# Patient Record
Sex: MALE | Race: WHITE | URBAN - METROPOLITAN AREA
[De-identification: names, ages, dates, MRNs, and addresses within clinical notes are randomized per-mention and may not be internally consistent; named-entity substitution may affect disease eponyms.]

---

## 2024-07-07 ENCOUNTER — OFFICE VISIT (OUTPATIENT)
Age: 64
End: 2024-07-07

## 2024-07-07 VITALS
DIASTOLIC BLOOD PRESSURE: 82 MMHG | BODY MASS INDEX: 38.87 KG/M2 | HEART RATE: 78 BPM | OXYGEN SATURATION: 94 % | TEMPERATURE: 97.9 F | WEIGHT: 287 LBS | HEIGHT: 72 IN | SYSTOLIC BLOOD PRESSURE: 120 MMHG

## 2024-07-07 DIAGNOSIS — S61.213A LACERATION OF LEFT MIDDLE FINGER WITHOUT FOREIGN BODY WITHOUT DAMAGE TO NAIL, INITIAL ENCOUNTER: Primary | ICD-10-CM

## 2024-07-07 RX ORDER — LISINOPRIL AND HYDROCHLOROTHIAZIDE 25; 20 MG/1; MG/1
TABLET ORAL
COMMUNITY
Start: 2024-06-12

## 2024-07-07 RX ORDER — LISINOPRIL 2.5 MG/1
2.5 TABLET ORAL DAILY
COMMUNITY

## 2024-07-07 RX ORDER — SEMAGLUTIDE 1.34 MG/ML
INJECTION, SOLUTION SUBCUTANEOUS
COMMUNITY
Start: 2024-06-23

## 2025-02-01 ENCOUNTER — OFFICE VISIT (OUTPATIENT)
Age: 65
End: 2025-02-01

## 2025-02-01 VITALS
BODY MASS INDEX: 36.84 KG/M2 | HEART RATE: 78 BPM | WEIGHT: 272 LBS | SYSTOLIC BLOOD PRESSURE: 112 MMHG | TEMPERATURE: 97.4 F | OXYGEN SATURATION: 94 % | DIASTOLIC BLOOD PRESSURE: 80 MMHG | HEIGHT: 72 IN

## 2025-02-01 DIAGNOSIS — J02.9 VIRAL PHARYNGITIS: Primary | ICD-10-CM

## 2025-02-01 RX ORDER — LIDOCAINE HYDROCHLORIDE 20 MG/ML
10 SOLUTION OROPHARYNGEAL
Qty: 200 ML | Refills: 1 | Status: SHIPPED | OUTPATIENT
Start: 2025-02-01

## 2025-02-01 RX ORDER — LOSARTAN POTASSIUM 25 MG/1
25 TABLET ORAL DAILY
COMMUNITY

## 2025-02-01 NOTE — PATIENT INSTRUCTIONS
May take ibuprofen and acetaminophen together for pain.   Take ibuprofen 400 mg -600 mg and acetaminophen every 8 hours as needed for pain.   Do not take more than 3000 mg of acetaminophen in 24 hours  If you take ibuprofen and two extra strength tylenol every 8 hours as needed for pain in 24 hours that will be the maximum of acetaminophen you can take in one day.

## 2025-02-01 NOTE — PROGRESS NOTES
Mat James (:  1960) is a 64 y.o. male,Established patient, here for evaluation of the following chief complaint(s):  Ear Pain (Pain in the right ear and sore throat x 2 days )      Assessment & Plan :  Visit Diagnoses and Associated Orders       Viral pharyngitis    -  Primary    lidocaine viscous hcl (XYLOCAINE) 2 % SOLN solution [75488]           ORDERS WITHOUT AN ASSOCIATED DIAGNOSIS    losartan (COZAAR) 25 MG tablet [44858]          Clinical exam consistent viral pharyngitis  Viscous lidocaine use as directed  Drink fluids  Gargle with warm salt water      Follow up in 7 days if symptoms persist or if symptoms worsen.     Patient verbalized understanding of printed and verbal discharge instructions including follow up care.   Follow up with your primary care provider for persistent symptoms.   Subjective :  Concerned that he has an infection      History provided by:  Patient    HPI:   64 y.o. male presents with symptoms of Sore Throat  Patient complains of sore throat. Associated symptoms include right ear pain, post nasal drip, and sore throat.Onset of symptoms was 1 day ago, unchanged since that time. He is drinking plenty of fluids. He has not had recent close exposure to someone with proven streptococcal pharyngitis.         Vitals:    25 1237 25 1303   BP: (!) 135/90 112/80   Site: Left Upper Arm Right Upper Arm   Position: Sitting Sitting   Cuff Size: Large Adult Large Adult   Pulse: 78    Temp: 97.4 °F (36.3 °C)    TempSrc: Oral    SpO2: 94%    Weight: 123.4 kg (272 lb)    Height: 1.829 m (6')           Objective   Physical Exam  Vitals and nursing note reviewed.   Constitutional:       Appearance: Normal appearance.   HENT:      Right Ear: Tympanic membrane, ear canal and external ear normal.      Left Ear: Tympanic membrane, ear canal and external ear normal.      Mouth/Throat:      Mouth: Mucous membranes are moist.      Pharynx: Oropharynx is clear. Uvula midline.